# Patient Record
Sex: MALE | Race: WHITE | URBAN - METROPOLITAN AREA
[De-identification: names, ages, dates, MRNs, and addresses within clinical notes are randomized per-mention and may not be internally consistent; named-entity substitution may affect disease eponyms.]

---

## 2021-11-02 ENCOUNTER — HISTORICAL (OUTPATIENT)
Dept: ADMINISTRATIVE | Facility: HOSPITAL | Age: 66
End: 2021-11-02

## 2021-12-10 ENCOUNTER — HISTORICAL (OUTPATIENT)
Dept: ADMINISTRATIVE | Facility: HOSPITAL | Age: 66
End: 2021-12-10

## 2022-04-30 NOTE — OP NOTE
Patient:   Edward Kamara            MRN: [id number]            FIN: [id number]               Age:   66 years     Sex:  Male     :  1955   Associated Diagnoses:   None   Author:   Camilo Pierce MD      Preoperative Diagnosis: Cataract Left eye    Postoperative Diagnosis: Cataract Left eye    Procedure: Phacoemulsification with intaocular lens implantations Left eye    Surgeon: Camilo Pierce MD    Assistant: ADEEL Kessler     Anestheisa: MAC    Complications: None    The patient was brought into the operating suite, where the patient was correctly identified as was the operative eye via timeout.  The patient was prepped and draped in a sterile ophthalmic fashion.  A lid speculum was placed in the operative eye and the microscope was brought into place.  A 1.0mm paracentesis was then made at (_6_) o'clock.  The anterior chamber was filled with Endocoat.  A ( temporal) clear corneal incision was made with a 2.4 mm keratome.  A 6 mm corneal marking ring was used to nori the cornea centered over the visual axis.  A 5.00 mm continuous curvilinear capsulorhexis was fashioned using a cystotome and microcapsular forceps.  Hydrodissection and hydrodelineation was performed with upreserved 1% Xylocaine.  The nucleus was then phacoemulsified with the Abbott phacoemulsification hand-piece with a total of (_0_) EFX.  The cortex was then removed with the I/A hand-piece. The lens model (__ZCB00__) with a power of (__17.5__) was placed in the capsular bag.  The Helon was then removed from the eye with the I/A hand piece.  The anterior chamber was inflated and the wounds were hydrated with BSS.  The wounds were checked with Weck-Lynda sponges and found to be watertight.  The lid speculum was removed and topical antibiotics were placed on the operative eye.  The patient was brought to PACU in good condition.

## 2022-04-30 NOTE — OP NOTE
Patient:   Edward Kamara            MRN: [id number]            FIN: [id number]               Age:   66 years     Sex:  Male     :  1955   Associated Diagnoses:   None   Author:   Camilo Pierce MD      Preoperative Diagnosis: Cataract Right eye    Postoperative Diagnosis: Cataract Right eye    Procedure: Phacoemulsification with intraocular lens implantations Right eye    Surgeon: Camilo Pierce MD    Assistant: ADEEL Koch    Anesthesia: MAC    Complications: None    The patient was brought into the operating suite, where the patient was correctly identified as was the operative eye via timeout.  The patient was prepped and draped in a sterile ophthalmic fashion.  A lid speculum was placed in the operative eye and the microscope was brought into place.  A 1.0mm paracentesis was then made at (12) o'clock.  The anterior chamber was filled with Endocoat.  A (temporal) clear corneal incision was made with a 2.4 mm keratome.  A 6 mm corneal marking ring was used to nori the cornea centered over the visual axis.  A 5.00 mm continuous curvilinear capsulorrhexis was fashioned using a cystotome and microcapsule forceps.  Hydrodissection and hydrodelineation was performed with unpreserved 1% Xylocaine.  The nucleus was then phacoemulsified with the Abbott phacoemulsification hand-piece with a total of (1) EFX.  The cortex was then removed with the I/A hand-piece. The lens model (ZCB00) with a power of (18.5) was placed in the capsular bag.  The Healon was then removed from the eye with the I/A hand piece.  The anterior chamber was inflated and the wounds were hydrated with BSS.  The wounds were checked with Weck-Lynda sponges and found to be watertight.  The lid speculum was removed and topical antibiotics were placed on the operative eye.  The patient was brought to PACU in good condition.

## 2025-06-12 ENCOUNTER — OFFICE VISIT (OUTPATIENT)
Dept: URGENT CARE | Facility: CLINIC | Age: 70
End: 2025-06-12
Payer: MEDICARE

## 2025-06-12 VITALS
DIASTOLIC BLOOD PRESSURE: 88 MMHG | HEART RATE: 57 BPM | TEMPERATURE: 99 F | SYSTOLIC BLOOD PRESSURE: 143 MMHG | HEIGHT: 70 IN | RESPIRATION RATE: 18 BRPM | BODY MASS INDEX: 25.62 KG/M2 | WEIGHT: 179 LBS | OXYGEN SATURATION: 98 %

## 2025-06-12 DIAGNOSIS — R42 DIZZINESS: Primary | ICD-10-CM

## 2025-06-12 LAB
EKG 12-LEAD: NORMAL
PR INTERVAL: NORMAL
PRT AXES: NORMAL
QRS DURATION: NORMAL
QT/QTC: NORMAL
VENTRICULAR RATE: NORMAL

## 2025-06-12 RX ORDER — FINASTERIDE 5 MG/1
5 TABLET, FILM COATED ORAL
COMMUNITY

## 2025-06-12 NOTE — PROGRESS NOTES
"Subjective:      Patient ID: Edward Kamara is a 69 y.o. male.    Vitals:  height is 5' 10" (1.778 m) and weight is 81.2 kg (179 lb). His oral temperature is 98.5 °F (36.9 °C). His blood pressure is 143/88 (abnormal) and his pulse is 57 (abnormal). His respiration is 18 and oxygen saturation is 98%.     Chief Complaint: Dizziness     Patient is a 69 y.o. male who presents to urgent care with complaints of dizziness, nausea, sweating onset started this morning but it has subsided since 2 pm.   Patient denies blurred vision, chest pain, vomiting, diarrhea, cough, syncope, shakiness.         [place]:  69-year-old male with known history of BPH currently on medications follows up with primary MD Dr. Lancaster.  Present to clinic with an episode of feeling dizzy, nausea and sweating around 12:15 p.m. today.  Felt dizzy laying back.  Symptom lasted for brief time, resolved by 2:00 p.m..  States since 2:00 p.m. symptoms resolved to 95%  States had breakfast this morning around 10:30 a.m. as usual.  Denies working in the [employer] or heat exhaustion.  Denies chest pain, no shortness a breath.  No similar complaints in the past.  No concerns of fall.  Patient had extensive cardiac evaluation January 2024 in the ER and then October 2024 by the cardiologist.  His ejection fraction was 60%.  No significant medical history except enlarged prostate. Denies eating anything different and no new medications.    ROS:  Constitutional : _ no fever, no body aches or headache, positive for sweats  Eyes : _No redness, drainage or pain  HENT_ denies sore throat difficulty swallowing, no congestion or postnasal drip  Respiratory_no wheezing, no shortness of breath, no coughing  Cardiovascular_no chest pain, no palpitations  Gastrointestinal_ nausea,No vomiting, No diarrhea, No abdominal pain  Musculoskeletal_no joint pain, no joint swelling  Integumentary_no skin rash      Objective:     Physical Exam  General : Alert and Oriented, No apparent distress, " afebrile, sitting on examination table, clear speech and communication on several topics.  Has a stack of discharge summaries and several cardiac report from last year and a half.  Neck - supple  HENT : Oropharynx no redness or swelling.  Oral mucous membrane pink and moist  Respiratory : Bilateral equal breath sounds, nonlabored respirations  Cardiovascular : Rate, rhythm regular, normal volume pulse, no murmur  Gastrointestinal: Full abdomen, soft, nontender to palpate  Musculoskeletal: Gait appears normal, joints full range of motion  Integumentary : Warm, Dry and no rash  Neurologic: Alert oriented x4  Assessment:     1. Dizziness      Plan:   Considering acute onset symptoms EKG today, heart rate 64 per minute, sinus rhythm.  No significant ST changes.  Considering new onset symptoms monitor the symptoms, encouraged to go to ER with any acute change in symptoms  Encouraged to follow up with primary MD sooner than his scheduled appointment  Adequate hydration.  Rest.  Avoid sudden change in moments  Monitor the symptoms.  Call or return to clinic for any questions.  Follow up with primary MD    Dizziness  -     POCT EKG 12-LEAD (Manually Resulted by Ordering Provider)

## 2025-06-12 NOTE — PATIENT INSTRUCTIONS
Considering acute onset symptoms EKG today, heart rate 64 per minute, sinus rhythm.  No significant ST changes.  Currently states symptoms resolved 95%.  Considering new onset symptoms monitor the symptoms, encouraged to go to ER with any acute change in symptoms  Encouraged to follow up with primary MD sooner than his scheduled appointment  Adequate hydration.  Rest.  Avoid sudden change in moments  Monitor the symptoms.  Call or return to clinic for any questions.  Follow up with primary MD